# Patient Record
Sex: FEMALE | Race: WHITE | Employment: UNEMPLOYED | URBAN - METROPOLITAN AREA
[De-identification: names, ages, dates, MRNs, and addresses within clinical notes are randomized per-mention and may not be internally consistent; named-entity substitution may affect disease eponyms.]

---

## 2017-03-15 ENCOUNTER — HOSPITAL ENCOUNTER (EMERGENCY)
Age: 57
Discharge: HOME | End: 2017-03-15
Attending: EMERGENCY MEDICINE
Payer: COMMERCIAL

## 2017-03-15 VITALS
WEIGHT: 173.72 LBS | OXYGEN SATURATION: 99 % | BODY MASS INDEX: 24.87 KG/M2 | HEIGHT: 70 IN | HEART RATE: 87 BPM | TEMPERATURE: 97.5 F | DIASTOLIC BLOOD PRESSURE: 105 MMHG | RESPIRATION RATE: 18 BRPM | SYSTOLIC BLOOD PRESSURE: 128 MMHG

## 2017-03-15 DIAGNOSIS — M79.651 PAIN OF RIGHT THIGH: ICD-10-CM
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
## 2017-03-15 DIAGNOSIS — M25.551 PAIN OF RIGHT HIP JOINT: ICD-10-CM

## 2017-03-15 PROCEDURE — 99283 EMERGENCY DEPT VISIT LOW MDM: CPT

## 2017-03-15 PROCEDURE — 74011250637 HC RX REV CODE- 250/637: Performed by: PHYSICIAN ASSISTANT

## 2017-03-15 RX ORDER — OXYCODONE AND ACETAMINOPHEN 5; 325 MG/1; MG/1
1 TABLET ORAL
Status: COMPLETED | OUTPATIENT
Start: 2017-03-15 | End: 2017-03-15

## 2017-03-15 RX ORDER — NAPROXEN 500 MG/1
500 TABLET ORAL 2 TIMES DAILY WITH MEALS
Qty: 20 TAB | Refills: 0 | Status: SHIPPED | OUTPATIENT
Start: 2017-03-15 | End: 2017-03-21

## 2017-03-15 RX ORDER — OXYCODONE AND ACETAMINOPHEN 5; 325 MG/1; MG/1
1 TABLET ORAL
Qty: 10 TAB | Refills: 0 | Status: SHIPPED | OUTPATIENT
Start: 2017-03-15 | End: 2017-03-17

## 2017-03-15 RX ORDER — NAPROXEN SODIUM 220 MG
220 TABLET ORAL
Status: DISCONTINUED | OUTPATIENT
Start: 2017-03-15 | End: 2017-03-15

## 2017-03-15 RX ORDER — NAPROXEN 250 MG/1
500 TABLET ORAL
Status: COMPLETED | OUTPATIENT
Start: 2017-03-15 | End: 2017-03-15

## 2017-03-15 RX ADMIN — NAPROXEN 500 MG: 250 TABLET ORAL at 13:01

## 2017-03-15 RX ADMIN — OXYCODONE HYDROCHLORIDE AND ACETAMINOPHEN 1 TABLET: 5; 325 TABLET ORAL at 13:01

## 2017-03-15 NOTE — DISCHARGE INSTRUCTIONS
Thank you for allowing us to provide you with excellent care today. We hope we addressed all of your concerns and needs. We strive to provide excellent quality care in the Emergency Department. Please rate us as excellent, as anything less than excellent does not meet our expectations. If you feel that you have not received excellent quality care or timely care, please ask to speak to the nurse manager. Please choose us in the future for your continued health care needs. The exam and treatment you received in the Emergency Department were for an urgent problem and are not intended as complete care. It is important that you follow-up with a doctor, nurse practitioner, or physician assistant to:  (1) confirm your diagnosis,  (2) re-evaluation of changes in your illness and treatment, and  (3) for ongoing care. If your symptoms become worse or you do not improve as expected and you are unable to reach your usual health care provider, you should return to the Emergency Department. We are available 24 hours a day. Take this sheet with you when you go to your follow-up visit. If you have any problem arranging the follow-up visit, contact [address] [place] [address] 21 [phone])    Make an appointment with your Primary Care doctor for follow up of this visit. Return to the ER if you are unable to be seen in the time recommended on your discharge instructions. Joint Pain: Care Instructions  Your Care Instructions  Many people have small aches and pains from overuse or injury to muscles and joints. Joint injuries often happen during sports or recreation, work tasks, or projects around the home. An overuse injury can happen when you put too much stress on a joint or when you do an activity that stresses the joint over and over, such as using the computer or rowing a boat. You can take action at home to help your muscles and joints get better.  You should feel better in 1 to 2 weeks, but it can take 3 months or more to heal completely. Follow-up care is a key part of your treatment and safety. Be sure to make and go to all appointments, and call your doctor if you are having problems. It's also a good idea to know your test results and keep a list of the medicines you take. How can you care for yourself at home? · Do not put weight on the injured joint for at least a day or two. · For the first day or two after an injury, do not take hot showers or baths, and do not use hot packs. The heat could make swelling worse. · Put ice or a cold pack on the sore joint for 10 to 20 minutes at a time. Try to do this every 1 to 2 hours for the next 3 days (when you are awake) or until the swelling goes down. Put a thin cloth between the ice and your skin. · Wrap the injury in an elastic bandage. Do not wrap it too tightly because this can cause more swelling. · Prop up the sore joint on a pillow when you ice it or anytime you sit or lie down during the next 3 days. Try to keep it above the level of your heart. This will help reduce swelling. · Take an over-the-counter pain medicine, such as acetaminophen (Tylenol), ibuprofen (Advil, Motrin), or naproxen (Aleve). Read and follow all instructions on the label. · After 1 or 2 days of rest, begin moving the joint gently. While the joint is still healing, you can begin to exercise using activities that do not strain or hurt the painful joint. When should you call for help? Call your doctor now or seek immediate medical care if:  · You have signs of infection, such as:  ¨ Increased pain, swelling, warmth, and redness. ¨ Red streaks leading from the joint. ¨ A fever. Watch closely for changes in your health, and be sure to contact your doctor if:  · Your movement or symptoms are not getting better after 1 to 2 weeks of home treatment. Where can you learn more? Go to http://teodoro-radha.info/.   Enter P205 in the search box to learn more about \"Joint Pain: Care Instructions. \"  Current as of: May 23, 2016  Content Version: 11.1  © 0618-7175 [employer], [facility]. Care instructions adapted under license by [employer] (which disclaims liability or warranty for this information). If you have questions about a medical condition or this instruction, always ask your healthcare professional. [place] [address] any warranty or liability for your use of this information.

## 2017-03-15 NOTE — ED NOTES
Discharge instructions reviewed with pt and copy given by Bashir [facility]; pt discharged with family member ambulatory with steady gait

## 2017-03-15 NOTE — ED NOTES
Pt advised that she should not drive while taking percocet, states \"i'm fine, tim driven on 429ZY of morphine before and i have the naloxone injector\"; pt states \"percocet 5mg isn't going to do anything\"; pt broke up percocet pill before putting in her mouth and states \"it will get in my system faster if i let it dissolve\"

## 2017-03-15 NOTE — ED PROVIDER NOTES
HPI Comments: Brandie Hassan is a 55 y.o. female with a PMH of arthritis and chronic back pain presenting ambulatory to the ED from work C/O fall prior to arrival. Patient states that she was seeing clients when she was walking on a floor that recently had been mopped and she slipped a \"did a split\". She denies LOC, or hitting her head. She states that she was able to get up on her own, but has been having pain in her right side, right hip, and right thigh. She states that she always has pain in her low back and has \"chips in her hips\". She states that she used to see a pain management doctor, but \"the [employer] shut him down in [place]", so she has not been taking any pain medications since then. She states that she has not been seen by any other pain management doctors because \"the [employer] [place] will shut them down as well\". She admits to chronic shortness of breath \"because I smoke\". She states that she has smoked since \"I was 12 and now I'm 56\". Denies taking any OTC analgesics PTA. Patient denies any other symptoms or complaints. PCP: Clyde Hoover MD    There are no other complaints, changes or physical findings at this time. The history is provided by the patient. No  was used. Past Medical History:   Diagnosis Date    Arthritis     Chronic back pain        History reviewed. No pertinent surgical history. Family History:   Problem Relation Age of Onset    Cancer Mother      Breast, bone, & lung Cancer  age 79    Heart Disease Father      Massive Heart attck  age 62       Social History     Social History    Marital status: LEGALLY      Spouse name: N/A    Number of children: N/A    Years of education: N/A     Occupational History    Not on file.      Social History Main Topics    Smoking status: Current Every Day Smoker     Packs/day: 4.00     Years: 41.00    Smokeless tobacco: Not on file    Alcohol use No    Drug use: No    Sexual activity: No Other Topics Concern    Not on file     Social History Narrative         ALLERGIES: Amoxil [amoxicillin] and Tetracycline    Review of Systems   Constitutional: Negative for chills and fever. HENT: Negative for sore throat. Eyes: Negative for pain. Respiratory: Negative for cough and shortness of breath. Cardiovascular: Negative for chest pain. Gastrointestinal: Negative for abdominal pain, diarrhea, nausea and vomiting. Genitourinary: Negative for dysuria and hematuria. Musculoskeletal: Positive for arthralgias (right hip, right thigh), back pain (chronic), myalgias (right thigh) and neck pain (chronic). Negative for gait problem and joint swelling. Skin: Negative for rash. Neurological: Positive for headaches. Negative for dizziness, light-headedness and numbness. Vitals:    03/15/17 1206   BP: (!) 128/105   Pulse: 87   Resp: 18   Temp: 97.5 °F (36.4 °C)   SpO2: 99%   Weight: 78.8 kg (173 lb 11.6 oz)   Height: 5' 10\" (1.778 m)            Physical Exam   Constitutional: She is oriented to person, place, and time. She appears well-developed and well-nourished. No distress. 54 y/o  female, disheveled, smells of stale tobacco   HENT:   Head: Normocephalic and atraumatic. Head is without raccoon's eyes, without Rowe's sign, without abrasion and without contusion. Right Ear: External ear normal.   Left Ear: External ear normal.   Nose: Nose normal.   Eyes: Conjunctivae and EOM are normal.   Neck: Normal range of motion. Neck supple. Cardiovascular: Normal rate, regular rhythm and normal heart sounds. No murmur heard. Pulmonary/Chest: Effort normal and breath sounds normal. She has no wheezes. Abdominal: Soft. Bowel sounds are normal. She exhibits no distension. There is no tenderness. There is no guarding. Musculoskeletal: Normal range of motion. She exhibits no edema or tenderness. Right hip: She exhibits bony tenderness.  She exhibits normal range of motion, normal strength, no swelling and no deformity. Right knee: Normal. She exhibits normal range of motion, no swelling, no effusion and no ecchymosis. Legs:  BACK: Normal spinal curvatures. No step off or deformity. NT to palpation. Negative seated SLR bilaterally. Strength of the LE 5/5 and equal bilaterally. Ambulatory without difficulty. Neurological: She is alert and oriented to person, place, and time. Skin: Skin is warm and dry. No rash noted. She is not diaphoretic. Psychiatric: She has a normal mood and affect. Her behavior is normal. Judgment normal.   Nursing note and vitals reviewed. MDM  Number of Diagnoses or Management Options  Fall, initial encounter:   Pain of right hip joint:   Pain of right thigh:   Diagnosis management comments: DDx: strain, sprain, contusion. Low concern for fracture secondary to FROM, no gross deformity or contusions present. Tenderness to palpation along muscle groups. Patient declined any x-rays at this time, stating \"nothing's broken\". Will provide analgesic relief as well as a small amount of pain medication to go home with. Amount and/or Complexity of Data Reviewed  Review and summarize past medical records: yes    Patient Progress  Patient progress: stable    ED Course       Procedures    Chief Complaint   Patient presents with    Fall     pt reports she slipped and fell on a wet floor PTA and c/o R side pain, R hip pain that radiated to R knee, pt denies hitting her head and denies LOC       12:21 PM  The patients presenting problems have been discussed, and they are in agreement with the care plan formulated and outlined with them. I have encouraged them to ask questions as they arise throughout their visit.     MEDICATIONS GIVEN:  Medications   oxyCODONE-acetaminophen (PERCOCET) 5-325 mg per tablet 1 Tab (1 Tab Oral Given 3/15/17 1301)   naproxen (NAPROSYN) tablet 500 mg (500 mg Oral Given 3/15/17 1301)       LABS REVIEWED:  No results found for this or any previous visit (from the past 24 hour(s)). VITAL SIGNS:  Patient Vitals for the past 12 hrs:   Temp Pulse Resp BP SpO2   03/15/17 1206 97.5 °F (36.4 °C) 87 18 (!) 128/105 99 %       PROGRESS NOTES:  12:22 PM   reviewed. Last narcotic fill on 09/07/2016 Oxycodone 15 mg 56 tablets prescribed. Patient receives monthly fill of Valium 10 mg.    12:36 PM  Patient states that her  will be coming to get her to take her home. Discussed that he will need to be present before any narcotic medication can be given. Patient understanding. Patient declined Flexeril or Robaxin, stating \"that sh** don't work\". 1:10 PM  When giving patient her discharge paperwork, she is seen sucking on the percocet and states \"it helps get to the oxycodone faster\". She is complaining that the Percocet is making her mouth sting. Advised her to the swallow the Percocet rather than suck on the remaining tablet. DIAGNOSIS:    1. Fall, initial encounter    2. Pain of right hip joint    3. Pain of right thigh        PLAN:  Follow-up Information     Follow up With Details Comments Contact Wiliam Mccollum MD In 1 week  [address] [place], [place] [address] [address]  [phone]      [facility] EMERGENCY DEPT  As needed, If symptoms worsen [address] [facility] [address]  [phone]        Discharge Medication List as of 3/15/2017 12:59 PM      START taking these medications    Details   naproxen (NAPROSYN) 500 mg tablet Take 1 Tab by mouth two (2) times daily (with meals) for 10 days. , Print, Disp-20 Tab, R-0      oxyCODONE-acetaminophen (PERCOCET) 5-325 mg per tablet Take 1 Tab by mouth every six (6) hours as needed for Pain for up to 3 days. Max Daily Amount: 4 Tabs., Print, Disp-10 Tab, R-0               ED COURSE: The patients hospital course has been uncomplicated.       DISCHARGE NOTE:  1:15 PM  The care plan has been outline with the patient and/or family, who verbally conveyed understanding and agreement. Available results have been reviewed. Patient and/or family understand the follow up plan as outlined and discharge instructions. Should their condition deterioration at any time after discharge the patient agrees to return, follow up sooner than outlined or seek medical assistance at the closest Emergency Room as soon as possible. Questions have been answered.  Discharge instructions and educational information regarding the patient's diagnosis as well a list of reasons why the patient would want to seek immediate medical attention, should their condition change, were reviewed directly with the patient/family

## 2017-03-15 NOTE — LETTER
[facility] EMERGENCY DEPT 
[address] [id number] [phone] Work/School Note Date: 3/15/2017 To Whom It May concern: Susan Wellington was seen and treated today in the emergency room by the following provider(s): 
Attending Provider: Ceasar Jenkins DO Physician Assistant: Jane Mata PA-C. Susan Wellington may return to work on 18 March 2017. Sincerely, Jane Mata PA-C

## 2017-03-15 NOTE — ED NOTES
Received pt to muniz chair, resting in position of comfort, pt state she \"slipped and fell on an unmarked wet floor at work\", denies LOC or hitting her head, states she landed injuring her back, both arms and legs

## 2017-03-17 ENCOUNTER — APPOINTMENT (OUTPATIENT)
Dept: GENERAL RADIOLOGY | Age: 57
End: 2017-03-17
Attending: EMERGENCY MEDICINE
Payer: COMMERCIAL

## 2017-03-17 ENCOUNTER — HOSPITAL ENCOUNTER (EMERGENCY)
Age: 57
Discharge: HOME | End: 2017-03-17
Attending: EMERGENCY MEDICINE
Payer: COMMERCIAL

## 2017-03-17 VITALS
HEIGHT: 70 IN | WEIGHT: 173.72 LBS | OXYGEN SATURATION: 96 % | SYSTOLIC BLOOD PRESSURE: 141 MMHG | DIASTOLIC BLOOD PRESSURE: 101 MMHG | HEART RATE: 88 BPM | RESPIRATION RATE: 16 BRPM | TEMPERATURE: 97.5 F | BODY MASS INDEX: 24.87 KG/M2

## 2017-03-17 DIAGNOSIS — S16.1XXA CERVICAL STRAIN, INITIAL ENCOUNTER: ICD-10-CM

## 2017-03-17 DIAGNOSIS — S76.011A HIP STRAIN, RIGHT, INITIAL ENCOUNTER: ICD-10-CM

## 2017-03-17 DIAGNOSIS — S50.01XA CONTUSION OF RIGHT ELBOW, INITIAL ENCOUNTER: Primary | ICD-10-CM

## 2017-03-17 PROCEDURE — 73080 X-RAY EXAM OF ELBOW: CPT

## 2017-03-17 PROCEDURE — 99283 EMERGENCY DEPT VISIT LOW MDM: CPT

## 2017-03-17 PROCEDURE — 72050 X-RAY EXAM NECK SPINE 4/5VWS: CPT

## 2017-03-17 PROCEDURE — 74011250637 HC RX REV CODE- 250/637: Performed by: EMERGENCY MEDICINE

## 2017-03-17 PROCEDURE — 73502 X-RAY EXAM HIP UNI 2-3 VIEWS: CPT

## 2017-03-17 RX ORDER — OXYCODONE AND ACETAMINOPHEN 5; 325 MG/1; MG/1
1 TABLET ORAL
Status: COMPLETED | OUTPATIENT
Start: 2017-03-17 | End: 2017-03-17

## 2017-03-17 RX ORDER — OXYCODONE AND ACETAMINOPHEN 5; 325 MG/1; MG/1
1 TABLET ORAL
Qty: 12 TAB | Refills: 0 | Status: SHIPPED | OUTPATIENT
Start: 2017-03-17 | End: 2017-03-21

## 2017-03-17 RX ORDER — DIAZEPAM 5 MG/1
5 TABLET ORAL
Qty: 15 TAB | Refills: 0 | Status: SHIPPED | OUTPATIENT
Start: 2017-03-17 | End: 2017-03-24 | Stop reason: CLARIF

## 2017-03-17 RX ORDER — DIAZEPAM 5 MG/1
5 TABLET ORAL
Status: COMPLETED | OUTPATIENT
Start: 2017-03-17 | End: 2017-03-17

## 2017-03-17 RX ADMIN — OXYCODONE HYDROCHLORIDE AND ACETAMINOPHEN 1 TABLET: 5; 325 TABLET ORAL at 19:29

## 2017-03-17 RX ADMIN — DIAZEPAM 5 MG: 5 TABLET ORAL at 19:29

## 2017-03-17 NOTE — ED PROVIDER NOTES
HPI Comments: Yana Moon is a 55 y.o. Female tobacco user 4ppd who has a h/o arthritis, spondylosis and chronic back pain presents to [facility] ED ambulatory with cc posterior neck , right elbow and right hip pain secondary to a ground level fall x 2 days. The patient states that she was at work, when she slipped on the ground, causing her to do the splits in a scissors like angle to the floor. The patient was evaluated at [facility] the day of her fall, at which time she was prescribed Naprosyn and Percocet, she did not have any imaging studies done during this visit. The patient states that she has been compliant with these medications, however she has only had mild pain relief. The patient does not have an orthopaedic specialist that she regularly sees. She does not endorse any exacerbating or alleviating factors for her recent symptom onset. She denies all other symptoms at this time, including any numbness/tinlging, back pain or incontinence. PCP: Alma Peterson MD    There are no other complaints changes or physical findings at this time  Written by JAKOB Martinez as dictated by Hawa Dawn MD.    The history is provided by the patient. Past Medical History:   Diagnosis Date    Arthritis     Chronic back pain        No past surgical history on file. Family History:   Problem Relation Age of Onset    Cancer Mother      Breast, bone, & lung Cancer  age 79    Heart Disease Father      Massive Heart attck  age 62       Social History     Social History    Marital status: LEGALLY      Spouse name: N/A    Number of children: N/A    Years of education: N/A     Occupational History    Not on file.      Social History Main Topics    Smoking status: Current Every Day Smoker     Packs/day: 4.00     Years: 41.00    Smokeless tobacco: Not on file    Alcohol use No    Drug use: No    Sexual activity: No     Other Topics Concern    Not on file     Social History Narrative         ALLERGIES: Amoxil [amoxicillin] and Tetracycline    Review of Systems   Constitutional: Negative. Negative for chills and fever. HENT: Negative. Negative for congestion and rhinorrhea. Respiratory: Negative. Negative for cough, chest tightness and wheezing. Cardiovascular: Negative. Negative for chest pain and palpitations. Gastrointestinal: Negative. Negative for abdominal pain, constipation, nausea and vomiting. Endocrine: Negative. Genitourinary: Negative. Negative for decreased urine volume, flank pain, hematuria and pelvic pain. Musculoskeletal: Positive for arthralgias and neck pain. Negative for back pain, myalgias and neck stiffness. Skin: Negative. Negative for color change, pallor and rash. Neurological: Negative. Negative for dizziness, seizures, weakness, numbness and headaches. Hematological: Negative. Negative for adenopathy. Psychiatric/Behavioral: Negative. All other systems reviewed and are negative. Vitals:    03/17/17 1727   BP: (!) 141/101   Pulse: 88   Resp: 16   Temp: 97.5 °F (36.4 °C)   SpO2: 96%   Weight: 78.8 kg (173 lb 11.6 oz)   Height: 5' 10\" (1.778 m)            Physical Exam   Constitutional: She is oriented to person, place, and time. She appears well-developed and well-nourished. No distress. HENT:   Head: Normocephalic and atraumatic. Mouth/Throat: No oropharyngeal exudate. Eyes: Pupils are equal, round, and reactive to light. Neck: Neck supple. No JVD present. Muscular tenderness present. No spinous process tenderness present. No rigidity. No tracheal deviation and normal range of motion present. No Brudzinski's sign and no Kernig's sign noted. Cardiovascular: Normal rate, regular rhythm, normal heart sounds and intact distal pulses. Exam reveals no gallop and no friction rub. No murmur heard. Pulmonary/Chest: Effort normal and breath sounds normal. No stridor. No respiratory distress. She has no wheezes.  She has no rales. She exhibits no tenderness. Abdominal: Soft. Bowel sounds are normal. She exhibits no distension and no mass. There is no tenderness. There is no rebound and no guarding. Musculoskeletal: Normal range of motion. She exhibits tenderness. Right elbow: She exhibits normal range of motion, no swelling, no effusion and no deformity. Tenderness found. Right hip: She exhibits tenderness. She exhibits normal range of motion, no swelling, no crepitus and no deformity. Left hip: Normal.        Lumbar back: Normal.   Lymphadenopathy:     She has no cervical adenopathy. Neurological: She is alert and oriented to person, place, and time. Skin: Skin is warm. She is not diaphoretic. Nursing note and vitals reviewed. MDM  Number of Diagnoses or Management Options  Cervical strain, initial encounter:   Contusion of right elbow, initial encounter:   Hip strain, right, initial encounter:   Diagnosis management comments: Ddx: Cervical sprain, cervical fx, elbow fx, hip fx, sprain, strain, drug seeking behavior    Impression/Plan: presents after fall while at work last week and in fact was seen here without x-rays performed. X-rays unremarkable today, will treat supportively and recommend f/u with orth as needed. Amount and/or Complexity of Data Reviewed  Tests in the radiology section of CPT®: ordered and reviewed  Obtain history from someone other than the patient: yes  Review and summarize past medical records: yes  Independent visualization of images, tracings, or specimens: yes    Risk of Complications, Morbidity, and/or Mortality  Presenting problems: moderate  Diagnostic procedures: low  Management options: low    Patient Progress  Patient progress: stable    ED Course       Procedures    LABORATORY TESTS:  No results found for this or any previous visit (from the past 12 hour(s)).     IMAGING RESULTS:  EXAM: XR HIP RT W OR WO PELV 2-3 VWS     INDICATION: fall.     COMPARISON: None.     FINDINGS: An AP view of the pelvis and a frogleg lateral view of the right hip  demonstrate no fracture, dislocation or other acute abnormality. There are  slight degenerative changes     IMPRESSION  IMPRESSION: No acute fracture identified. .     INDICATION: fall.     EXAM: CERVICAL SPINE RADIOGRAPHS, MINIMUM 4 VIEWS.     COMPARISON: 1/21/2015     FINDINGS: A five-view examination of the cervical spine reveals normal bony  alignment and bone mineral content for age. There is no obvious acute fracture  or dislocation . Vertebral body heights are preserved. Disc space heights are  diminished at C5-6 and C6-7 with endplate sclerosis and spondylosis. . The  prevertebral soft tissue space is normal, as is the predental space. Odontoid  view shows normal alignment. . There is mild diffuse degenerative change.     IMPRESSION  IMPRESSION: No acute bony abnormality of the cervical spine . Multilevel  degenerative disc disease. Pranav Tuckahoe EXAM: XR ELBOW RT MIN 3 V     INDICATION: fall.     COMPARISON: None.     FINDINGS: Three views of the right elbow demonstrate no fracture, dislocation,  effusion or other acute abnormality.     IMPRESSION  IMPRESSION: No acute abnormality.     MEDICATIONS GIVEN:  Medications   oxyCODONE-acetaminophen (PERCOCET) 5-325 mg per tablet 1 Tab (1 Tab Oral Given 3/17/17 1929)   diazePAM (VALIUM) tablet 5 mg (5 mg Oral Given 3/17/17 1929)       IMPRESSION:  1. Contusion of right elbow, initial encounter    2. Cervical strain, initial encounter    3. Hip strain, right, initial encounter        PLAN:  1. Current Discharge Medication List      START taking these medications    Details   diazePAM (VALIUM) 5 mg tablet Take 1 Tab by mouth every eight (8) hours as needed for Anxiety.  Max Daily Amount: 15 mg.  Qty: 15 Tab, Refills: 0         CONTINUE these medications which have CHANGED    Details   oxyCODONE-acetaminophen (PERCOCET) 5-325 mg per tablet Take 1 Tab by mouth every four (4) hours as needed for Pain. Max Daily Amount: 6 Tabs. Qty: 12 Tab, Refills: 0           2. Follow-up Information     Follow up With Details Comments Contact Tommie Rossi MD Call in 2 days  [address]  [phone]      [facility] EMERGENCY DEPT  If symptoms worsen [address]  [address] [facility]  [phone]        Return to ED if worse   Discharge Note:  8:58 PM  The patient is ready for discharge. The patient's signs, symptoms, diagnosis, and discharge instructions have been discussed and the patient has conveyed their understanding. The patient is to follow up as recommended or return to the ER should their symptoms worsen. Plan has been discussed and the patient is in agreement. This note is prepared by Derwin Schwab acting as Scribe for Yonug Samano MD.    Young Samano MD: The Scribe's documentation has been prepared under my direction and personally reviewed by me in its entirety. I confirm that the note above accurately reflects all work, treatment, procedures, and medical decision making performed by me.

## 2017-03-17 NOTE — ED NOTES
Patient reports \"falling last Wednesday and refused xray at this time but feels that it is necessary today because her pain is not getting any better and she also ran out of her Percocet that was prescribed to her. \" Patient reports right sided upper neck pain that radiates down right side of body all the way down right leg to right ankle.

## 2017-03-17 NOTE — ED NOTES
Pt didn't want to sit on the stretcher, taken to muniz bed via wheelchair-- transferred to chair without difficulty

## 2017-03-18 NOTE — DISCHARGE INSTRUCTIONS
Neck Strain: Care Instructions  Your Care Instructions  You have strained the muscles and ligaments in your neck. A sudden, awkward movement can strain the neck. This often occurs with falls or car accidents or during certain sports. Everyday activities like working on a computer or sleeping can also cause neck strain if they force you to hold your neck in an awkward position for a long time. It is common for neck pain to get worse for a day or two after an injury, but it should start to feel better after that. You may have more pain and stiffness for several days before it gets better. This is expected. It may take a few weeks or longer for it to heal completely. Good home treatment can help you get better faster and avoid future neck problems. Follow-up care is a key part of your treatment and safety. Be sure to make and go to all appointments, and call your doctor if you are having problems. It's also a good idea to know your test results and keep a list of the medicines you take. How can you care for yourself at home? · If you were given a neck brace (cervical collar) to limit neck motion, wear it as instructed for as many days as your doctor tells you to. Do not wear it longer than you were told to. Wearing a brace for too long can make neck stiffness worse and weaken the neck muscles. · You can try using heat or ice to see if it helps. ¨ Try using a heating pad on a low or medium setting for 15 to 20 minutes every 2 to 3 hours. Try a warm shower in place of one session with the heating pad. You can also buy single-use heat wraps that last up to 8 hours. ¨ You can also try an ice pack for 10 to 15 minutes every 2 to 3 hours. · Take pain medicines exactly as directed. ¨ If the doctor gave you a prescription medicine for pain, take it as prescribed. ¨ If you are not taking a prescription pain medicine, ask your doctor if you can take an over-the-counter medicine.   · Gently rub the area to relieve pain and help with blood flow. Do not massage the area if it hurts to do so. · Do not do anything that makes the pain worse. Take it easy for a couple of days. You can do your usual activities if they do not hurt your neck or put it at risk for more stress or injury. · Try sleeping on a special neck pillow. Place it under your neck, not under your head. Placing a tightly rolled-up towel under your neck while you sleep will also work. If you use a neck pillow or rolled towel, do not use your regular pillow at the same time. · To prevent future neck pain, do exercises to stretch and strengthen your neck and back. Learn how to use good posture, safe lifting techniques, and proper body mechanics. When should you call for help? Call 911 anytime you think you may need emergency care. For example, call if:  · You are unable to move an arm or a leg at all. Call your doctor now or seek immediate medical care if:  · You have new or worse symptoms in your arms, legs, chest, belly, or buttocks. Symptoms may include:  ¨ Numbness or tingling. ¨ Weakness. ¨ Pain. · You lose bladder or bowel control. Watch closely for changes in your health, and be sure to contact your doctor if:  · You are not getting better as expected. Where can you learn more? Go to http://teodoro-radha.info/. Enter M253 in the search box to learn more about \"Neck Strain: Care Instructions. \"  Current as of: May 23, 2016  Content Version: 11.1  © 2659-9726 [employer]. Care instructions adapted under license by [employer] (which disclaims liability or warranty for this information). If you have questions about a medical condition or this instruction, always ask your healthcare professional. [address] any warranty or liability for your use of this information. [facility] Activation    Thank you for requesting access to [facility].  Please follow the instructions below to securely access and download your online medical record. [facility] allows you to send messages to your doctor, view your test results, renew your prescriptions, schedule appointments, and more. How Do I Sign Up? 1. In your internet browser, go to [email or web address]  2. Click on the First Time User? Click Here link in the Sign In box. You will be redirect to the New Member Sign Up page. 3. Enter your [facility] Access Code exactly as it appears below. You will not need to use this code after youve completed the sign-up process. If you do not sign up before the expiration date, you must request a new code. [facility] Access Code: Activation code not generated  Current [facility] Status: Active (This is the date your [facility] access code will )    4. Enter the last four digits of your Social Security Number (xxxx) and Date of Birth (mm/dd/yyyy) as indicated and click Submit. You will be taken to the next sign-up page. 5. Create a [facility] ID. This will be your [facility] login ID and cannot be changed, so think of one that is secure and easy to remember. 6. Create a [facility] password. You can change your password at any time. 7. Enter your Password Reset Question and Answer. This can be used at a later time if you forget your password. 8. Enter your e-mail address. You will receive e-mail notification when new information is available in [address] 9. Click Sign Up. You can now view and download portions of your medical record. 10. Click the Download Summary menu link to download a portable copy of your medical information. Additional Information    If you have questions, please visit the Frequently Asked Questions section of the [facility]ius website at [email or web address] Remember, [facility] is NOT to be used for urgent needs. For medical emergencies, dial 911.

## 2017-03-21 ENCOUNTER — HOSPITAL ENCOUNTER (EMERGENCY)
Age: 57
Discharge: HOME | End: 2017-03-21
Attending: EMERGENCY MEDICINE
Payer: COMMERCIAL

## 2017-03-21 VITALS
WEIGHT: 172.18 LBS | BODY MASS INDEX: 25.5 KG/M2 | OXYGEN SATURATION: 99 % | TEMPERATURE: 97.5 F | HEIGHT: 69 IN | HEART RATE: 82 BPM | DIASTOLIC BLOOD PRESSURE: 105 MMHG | SYSTOLIC BLOOD PRESSURE: 157 MMHG | RESPIRATION RATE: 16 BRPM

## 2017-03-21 DIAGNOSIS — Z72.0 TOBACCO ABUSE: ICD-10-CM

## 2017-03-21 DIAGNOSIS — M25.551 RIGHT HIP PAIN: Primary | ICD-10-CM

## 2017-03-21 DIAGNOSIS — F41.9 ANXIETY: ICD-10-CM

## 2017-03-21 PROCEDURE — 74011250637 HC RX REV CODE- 250/637: Performed by: PHYSICIAN ASSISTANT

## 2017-03-21 PROCEDURE — 99283 EMERGENCY DEPT VISIT LOW MDM: CPT

## 2017-03-21 RX ORDER — OXYCODONE AND ACETAMINOPHEN 5; 325 MG/1; MG/1
1 TABLET ORAL
Qty: 10 TAB | Refills: 0 | Status: SHIPPED | OUTPATIENT
Start: 2017-03-21 | End: 2017-03-24 | Stop reason: CLARIF

## 2017-03-21 RX ORDER — OXYCODONE AND ACETAMINOPHEN 5; 325 MG/1; MG/1
1 TABLET ORAL
Status: COMPLETED | OUTPATIENT
Start: 2017-03-21 | End: 2017-03-21

## 2017-03-21 RX ORDER — DIAZEPAM 5 MG/1
5 TABLET ORAL
Qty: 10 TAB | Refills: 0 | Status: SHIPPED | OUTPATIENT
Start: 2017-03-21 | End: 2017-03-24 | Stop reason: CLARIF

## 2017-03-21 RX ADMIN — OXYCODONE HYDROCHLORIDE AND ACETAMINOPHEN 1 TABLET: 5; 325 TABLET ORAL at 12:13

## 2017-03-21 NOTE — DISCHARGE INSTRUCTIONS
Hip Pain: Care Instructions  Your Care Instructions  Hip pain may be caused by many things, including overuse, a fall, or a twisting movement. Another cause of hip pain is arthritis. Your pain may increase when you stand up, walk, or squat. The pain may come and go or may be constant. Home treatment can help relieve hip pain, swelling, and stiffness. If your pain is ongoing, you may need more tests and treatment. Follow-up care is a key part of your treatment and safety. Be sure to make and go to all appointments, and call your doctor if you are having problems. Its also a good idea to know your test results and keep a list of the medicines you take. How can you care for yourself at home? · Take pain medicines exactly as directed. ¨ If the doctor gave you a prescription medicine for pain, take it as prescribed. ¨ If you are not taking a prescription pain medicine, ask your doctor if you can take an over-the-counter medicine. · Rest and protect your hip. Take a break from any activity, including standing or walking, that may cause pain. · Put ice or a cold pack against your hip for 10 to 20 minutes at a time. Try to do this every 1 to 2 hours for the next 3 days (when you are awake) or until the swelling goes down. Put a thin cloth between the ice and your skin. · Sleep on your healthy side with a pillow between your knees, or sleep on your back with pillows under your knees. · If there is no swelling, you can put moist heat, a heating pad, or a warm cloth on your hip. Do gentle stretching exercises to help keep your hip flexible. · Learn how to prevent falls. Have your vision and hearing checked regularly. Wear slippers or shoes with a nonskid sole. · Stay at a healthy weight. · Wear comfortable shoes. When should you call for help? Call 911 anytime you think you may need emergency care. For example, call if:  · You have sudden chest pain and shortness of breath, or you cough up blood.   · You are not able to stand or walk or bear weight. · Your buttocks, legs, or feet feel numb or tingly. · Your leg or foot is cool or pale or changes color. · You have severe pain. Call your doctor now or seek immediate medical care if:  · You have signs of infection, such as:  ¨ Increased pain, swelling, warmth, or redness in the hip area. ¨ Red streaks leading from the hip area. ¨ Pus draining from the hip area. ¨ A fever. · You have signs of a blood clot, such as:  ¨ Pain in your calf, back of the knee, thigh, or groin. ¨ Redness and swelling in your leg or groin. · You are not able to bend, straighten, or move your leg normally. · You have trouble urinating or having bowel movements. Watch closely for changes in your health, and be sure to contact your doctor if:  · You do not get better as expected. Where can you learn more? Go to http://teodoro-radha.info/. Enter K480 in the search box to learn more about \"Hip Pain: Care Instructions. \"  Current as of: May 27, 2016  Content Version: 11.1  © 0813-2800 [employer]. Care instructions adapted under license by [employer] (which disclaims liability or warranty for this information). If you have questions about a medical condition or this instruction, always ask your healthcare professional. [place] [address] any warranty or liability for your use of this information. Anxiety Disorder: Care Instructions  Your Care Instructions  Anxiety is a normal reaction to stress. Difficult situations can cause you to have symptoms such as sweaty palms and a nervous feeling. In an anxiety disorder, the symptoms are far more severe. Constant worry, muscle tension, trouble sleeping, nausea and diarrhea, and other symptoms can make normal daily activities difficult or impossible. These symptoms may occur for no reason, and they can affect your work, school, or social life.  Medicines, counseling, and self-care can all help. Follow-up care is a key part of your treatment and safety. Be sure to make and go to all appointments, and call your doctor if you are having problems. It's also a good idea to know your test results and keep a list of the medicines you take. How can you care for yourself at home? · Take medicines exactly as directed. Call your doctor if you think you are having a problem with your medicine. · Go to your counseling sessions and follow-up appointments. · Recognize and accept your anxiety. Then, when you are in a situation that makes you anxious, say to yourself, \"This is not an emergency. I feel uncomfortable, but I am not in danger. I can keep going even if I feel anxious. \"  · Be kind to your body:  ¨ Relieve tension with exercise or a massage. ¨ Get enough rest.  ¨ Avoid alcohol, caffeine, nicotine, and illegal drugs. They can increase your anxiety level and cause sleep problems. ¨ Learn and do relaxation techniques. See below for more about these techniques. · Engage your mind. Get out and do something you enjoy. Go to a [employer] movie, or take a walk or hike. Plan your day. Having too much or too little to do can make you anxious. · Keep a record of your symptoms. Discuss your fears with a good friend or family member, or join a support group for people with similar problems. Talking to others sometimes relieves stress. · Get involved in social groups, or volunteer to help others. Being alone sometimes makes things seem worse than they are. · Get at least 30 minutes of exercise on most days of the week to relieve stress. Walking is a good choice. You also may want to do other activities, such as running, swimming, cycling, or playing tennis or team sports. Relaxation techniques  Do relaxation exercises 10 to 20 minutes a day. You can play soothing, relaxing music while you do them, if you wish. · Tell others in your house that you are going to do your relaxation exercises.  Ask them not to disturb you. · Find a comfortable place, away from all distractions and noise. · Lie down on your back, or sit with your back straight. · Focus on your breathing. Make it slow and steady. · Breathe in through your nose. Breathe out through either your nose or mouth. · Breathe deeply, filling up the area between your navel and your rib cage. Breathe so that your belly goes up and down. · Do not hold your breath. · Breathe like this for 5 to 10 minutes. Notice the feeling of calmness throughout your whole body. As you continue to breathe slowly and deeply, relax by doing the following for another 5 to 10 minutes:  · Tighten and relax each muscle group in your body. You can begin at your toes and work your way up to your head. · Imagine your muscle groups relaxing and becoming heavy. · Empty your mind of all thoughts. · Let yourself relax more and more deeply. · Become aware of the state of calmness that surrounds you. · When your relaxation time is over, you can bring yourself back to alertness by moving your fingers and toes and then your hands and feet and then stretching and moving your entire body. Sometimes people fall asleep during relaxation, but they usually wake up shortly afterward. · Always give yourself time to return to full alertness before you drive a car or do anything that might cause an accident if you are not fully alert. Never play a relaxation tape while you drive a car. When should you call for help? Call 911 anytime you think you may need emergency care. For example, call if:  · You feel you cannot stop from hurting yourself or someone else. Keep the numbers for these national suicide hotlines: 3-284-514-TALK (4-872.300.9330) and 3-016-YOKYLJW (3-273.564.9635). If you or someone you know talks about suicide or feeling hopeless, get help right away.   Watch closely for changes in your health, and be sure to contact your doctor if:  · You have anxiety or fear that affects your life. · You have symptoms of anxiety that are new or different from those you had before. Where can you learn more? Go to http://teodoro-radha.info/. Enter P754 in the search box to learn more about \"Anxiety Disorder: Care Instructions. \"  Current as of: July 26, 2016  Content Version: 11.1  © 7483-7265 [employer]. Care instructions adapted under license by [employer] (which disclaims liability or warranty for this information). If you have questions about a medical condition or this instruction, always ask your healthcare professional. [place] [address] any warranty or liability for your use of this information. Learning About Benefits From Quitting Smoking  How does quitting smoking make you healthier? If you're thinking about quitting smoking, you may have a few reasons to be smoke-free. Your health may be one of them. · When you quit smoking, you lower your risks for cancer, lung disease, heart attack, stroke, blood vessel disease, and blindness from macular degeneration. · When you're smoke-free, you get sick less often, and you heal faster. You are less likely to get colds, flu, bronchitis, and pneumonia. · As a nonsmoker, you may find that your mood is better and you are less stressed. When and how will you feel healthier? Quitting has real health benefits that start from day 1 of being smoke-free. And the longer you stay smoke-free, the healthier you get and the better you feel. The first hours  · After just 20 minutes, your blood pressure and heart rate go down. That means there's less stress on your heart and blood vessels. · Within 12 hours, the level of carbon monoxide in your blood drops back to normal. That makes room for more oxygen. With more oxygen in your body, you may notice that you have more energy than when you smoked. After 2 weeks  · Your lungs start to work better.   · Your risk of heart attack starts to drop. After 1 month  · When your lungs are clear, you cough less and breathe deeper, so it's easier to be active. · Your sense of taste and smell return. That means you can enjoy food more than you have since you started smoking. Over the years  · After 1 year, your risk of heart disease is half what it would be if you kept smoking. · After 5 years, your risk of stroke starts to shrink. Within a few years after that, it's about the same as if you'd never smoked. · After 10 years, your risk of dying from lung cancer is cut by about half. And your risk for many other types of cancer is lower too. How would quitting help others in your life? When you quit smoking, you improve the health of everyone who now breathes in your smoke. · Their heart, lung, and cancer risks drop, much like yours. · They are sick less. For babies and small children, living smoke-free means they're less likely to have ear infections, pneumonia, and bronchitis. · If you're a woman who is or will be pregnant someday, quitting smoking means a healthier . · Children who are close to you are less likely to become adult smokers. Where can you learn more? Go to http://teodoro-radha.info/. Enter [id number] [id number] [id number] in the search box to learn more about \"Learning About Benefits From Quitting Smoking. \"  Current as of: May 26, 2016  Content Version: 11.1  © 5250-3872 [employer]. Care instructions adapted under license by [employer] (which disclaims liability or warranty for this information). If you have questions about a medical condition or this instruction, always ask your healthcare professional. [address] any warranty or liability for your use of this information.

## 2017-03-21 NOTE — ED PROVIDER NOTES
HPI Comments: Lia Escobar is a 55 y.o. female who presents  to the ED c/o persistent right hip pain after a slip and fall on 03/15/2016. She was evaluated here on that day and then again 2 days later here. Plain films from that visit were negative for acute process. She tells me her pain persists. She has attended Pain Management in the past for her chronic pain issues however her new Pain Management appointment is not until mid April. Pt states she did stumble this morning and hit her head prior to going to work. While at work she states the pain in her hip become worse and she came to the ED. PCP: Claudette Bugler, MD    There are no other complaints, changes, or physical findings at this time. Patient is a 55 y.o. female presenting with medication refill and fall. Medication Refill   Pertinent negatives include no chest pain, no abdominal pain, no headaches and no shortness of breath. Fall   Pertinent negatives include no fever, no numbness, no abdominal pain, no nausea, no vomiting and no headaches. Past Medical History:   Diagnosis Date    Arthritis     Chronic back pain        History reviewed. No pertinent surgical history. Family History:   Problem Relation Age of Onset    Cancer Mother      Breast, bone, & lung Cancer  age 79    Heart Disease Father      Massive Heart attck  age 62       Social History     Social History    Marital status: LEGALLY      Spouse name: N/A    Number of children: N/A    Years of education: N/A     Occupational History    Not on file.      Social History Main Topics    Smoking status: Current Every Day Smoker     Packs/day: 4.00     Years: 41.00    Smokeless tobacco: Not on file    Alcohol use No    Drug use: No    Sexual activity: No     Other Topics Concern    Not on file     Social History Narrative         ALLERGIES: Amoxil [amoxicillin] and Tetracycline    Review of Systems   Constitutional: Negative for fatigue and fever.   HENT: Negative for ear pain and sore throat. Eyes: Negative for pain, redness and visual disturbance. Respiratory: Negative for cough and shortness of breath. Cardiovascular: Negative for chest pain and palpitations. Gastrointestinal: Negative for abdominal pain, nausea and vomiting. Genitourinary: Negative for dysuria, frequency and urgency. Musculoskeletal: Negative for back pain, gait problem, neck pain and neck stiffness. Right hip pain   Skin: Negative for rash and wound. Neurological: Negative for dizziness, weakness, light-headedness, numbness and headaches. Vitals:    03/21/17 1136   BP: (!) 157/105   Pulse: 82   Resp: 16   Temp: 97.5 °F (36.4 °C)   SpO2: 99%   Weight: 78.1 kg (172 lb 2.9 oz)   Height: 5' 9\" (1.753 m)            Physical Exam   Constitutional: She is oriented to person, place, and time. She appears well-developed and well-nourished. Non-toxic appearance. No distress. HENT:   Head: Normocephalic and atraumatic. Right Ear: External ear normal.   Left Ear: External ear normal.   Nose: Nose normal.   Mouth/Throat: Uvula is midline. No trismus in the jaw. Eyes: Conjunctivae and EOM are normal. Pupils are equal, round, and reactive to light. No scleral icterus. Neck: Normal range of motion and full passive range of motion without pain. Cardiovascular: Normal rate and regular rhythm. Pulmonary/Chest: Effort normal. No accessory muscle usage. No tachypnea. No respiratory distress. She has no decreased breath sounds. She has no wheezes. Abdominal: Soft. There is no tenderness. Musculoskeletal: Normal range of motion. Right hip: She exhibits tenderness. She exhibits normal range of motion and normal strength. Legs:  RIGHT HIP:   Good symmetry  Ambulates with no appreciable limp  Lateral tenderness   Neurological: She is alert and oriented to person, place, and time. She is not disoriented. No cranial nerve deficit.  GCS eye subscore is 4. GCS verbal subscore is 5. GCS motor subscore is 6. Skin: Skin is intact. No rash noted. Psychiatric: She has a normal mood and affect. Her speech is normal.   Nursing note and vitals reviewed. [facility]  ED Course       Procedures     TOBACCO COUNSELING:  Upon evaluation, pt expressed that they are a current tobacco user. Pt has been counseled on the dangers of smoking and was encouraged to quit as soon as possible in order to decrease further risks to their health. Pt has conveyed their understanding of the risks involved should they continue to use tobacco products. HYPERTENSION COUNSELING:  Patient denies chest pain, headache, shortness of breath. Patient is made aware of their elevated blood pressure and is instructed to follow up this week with their Primary Care for a recheck. Patient is counseled regarding consequences of chronic, uncontrolled hypertension including kidney disease, heart disease, stroke or even death. Patient states their understanding. LABORATORY TESTS:  No results found for this or any previous visit (from the past 12 hour(s)). IMAGING RESULTS:  No orders to display       MEDICATIONS GIVEN:  Medications   oxyCODONE-acetaminophen (PERCOCET) 5-325 mg per tablet 1 Tab (1 Tab Oral Given 3/21/17 1213)       IMPRESSION:  1. Right hip pain    2. Anxiety    3. Tobacco abuse        PLAN:  1. Current Discharge Medication List      START taking these medications    Details   oxyCODONE-acetaminophen (PERCOCET) 5-325 mg per tablet Take 1 Tab by mouth every four (4) hours as needed for Pain. Max Daily Amount: 6 Tabs. Qty: 10 Tab, Refills: 0      !! diazePAM (VALIUM) 5 mg tablet Take 1 Tab by mouth three (3) times daily as needed for Anxiety (spasm). Max Daily Amount: 15 mg.  Qty: 10 Tab, Refills: 0       !! - Potential duplicate medications found. Please discuss with provider.       CONTINUE these medications which have NOT CHANGED    Details   !! diazePAM (VALIUM) 5 mg tablet Take 1 Tab by mouth every eight (8) hours as needed for Anxiety. Max Daily Amount: 15 mg.  Qty: 15 Tab, Refills: 0       !! - Potential duplicate medications found. Please discuss with provider.         2.   Follow-up Information     Follow up With Details Comments Contact Obey Allen MD Schedule an appointment as soon as possible for a visit As needed [facility] [address]      June Webber MD Schedule an appointment as soon as possible for a visit ORTHO: as needed if symptoms persist [address]  [address] [facility] [address]  [phone]       Agustín Brown MD Schedule an appointment as soon as possible for a visit ORTHO: as needed [address]   [address]  [facility] [place] [address]  [phone]          Return to ED if worse

## 2017-03-21 NOTE — LETTER
[address] 
[facility] EMERGENCY DEPT 
[address] [place] [address] [address] [id number] [phone] Work/School Note Date: 3/21/2017 To Whom It May concern: Meir Kwong was seen and treated today in the emergency room by the following provider(s): 
Attending Provider: Tennille Jhaveri MD 
Physician Assistant: HENRY Reddy. Meir Kwong may return to work on [id number]. Sincerely, HENRY Reddy

## 2017-03-24 ENCOUNTER — HOSPITAL ENCOUNTER (EMERGENCY)
Age: 57
Discharge: HOME | End: 2017-03-24
Attending: EMERGENCY MEDICINE
Payer: COMMERCIAL

## 2017-03-24 VITALS
TEMPERATURE: 97.9 F | HEART RATE: 94 BPM | RESPIRATION RATE: 14 BRPM | HEIGHT: 70 IN | OXYGEN SATURATION: 98 % | DIASTOLIC BLOOD PRESSURE: 93 MMHG | WEIGHT: 170.64 LBS | BODY MASS INDEX: 24.43 KG/M2 | SYSTOLIC BLOOD PRESSURE: 131 MMHG

## 2017-03-24 DIAGNOSIS — M19.90 ARTHRITIS: ICD-10-CM

## 2017-03-24 DIAGNOSIS — F17.210 CIGARETTE NICOTINE DEPENDENCE WITHOUT COMPLICATION: ICD-10-CM

## 2017-03-24 DIAGNOSIS — M25.552 ACUTE PAIN OF LEFT HIP: Primary | ICD-10-CM

## 2017-03-24 PROCEDURE — 74011250637 HC RX REV CODE- 250/637: Performed by: PHYSICIAN ASSISTANT

## 2017-03-24 PROCEDURE — 99283 EMERGENCY DEPT VISIT LOW MDM: CPT

## 2017-03-24 RX ORDER — OXYCODONE AND ACETAMINOPHEN 5; 325 MG/1; MG/1
1 TABLET ORAL
Status: COMPLETED | OUTPATIENT
Start: 2017-03-24 | End: 2017-03-24

## 2017-03-24 RX ORDER — NAPROXEN 500 MG/1
TABLET ORAL
COMMUNITY
Start: 2017-03-15 | End: 2017-03-24

## 2017-03-24 RX ORDER — OXYCODONE AND ACETAMINOPHEN 5; 325 MG/1; MG/1
.5-1 TABLET ORAL
Qty: 20 TAB | Refills: 0 | Status: SHIPPED | OUTPATIENT
Start: 2017-03-24 | End: 2017-04-20 | Stop reason: ALTCHOICE

## 2017-03-24 RX ORDER — OXYCODONE AND ACETAMINOPHEN 5; 325 MG/1; MG/1
TABLET ORAL
Refills: 0 | COMMUNITY
Start: 2017-03-21 | End: 2017-03-24

## 2017-03-24 RX ORDER — DIAZEPAM 5 MG/1
TABLET ORAL
Refills: 0 | COMMUNITY
Start: 2017-03-21 | End: 2017-03-24

## 2017-03-24 RX ORDER — NAPROXEN 250 MG/1
500 TABLET ORAL
Status: COMPLETED | OUTPATIENT
Start: 2017-03-24 | End: 2017-03-24

## 2017-03-24 RX ORDER — PIROXICAM 20 MG/1
20 CAPSULE ORAL DAILY
Qty: 30 CAP | Refills: 0 | Status: SHIPPED | OUTPATIENT
Start: 2017-03-24 | End: 2017-04-20 | Stop reason: ALTCHOICE

## 2017-03-24 RX ADMIN — NAPROXEN 500 MG: 250 TABLET ORAL at 18:53

## 2017-03-24 RX ADMIN — OXYCODONE HYDROCHLORIDE AND ACETAMINOPHEN 1 TABLET: 5; 325 TABLET ORAL at 18:53

## 2017-03-24 NOTE — DISCHARGE INSTRUCTIONS
Learning About Benefits From Quitting Smoking  How does quitting smoking make you healthier? If you're thinking about quitting smoking, you may have a few reasons to be smoke-free. Your health may be one of them. · When you quit smoking, you lower your risks for cancer, lung disease, heart attack, stroke, blood vessel disease, and blindness from macular degeneration. · When you're smoke-free, you get sick less often, and you heal faster. You are less likely to get colds, flu, bronchitis, and pneumonia. · As a nonsmoker, you may find that your mood is better and you are less stressed. When and how will you feel healthier? Quitting has real health benefits that start from day 1 of being smoke-free. And the longer you stay smoke-free, the healthier you get and the better you feel. The first hours  · After just 20 minutes, your blood pressure and heart rate go down. That means there's less stress on your heart and blood vessels. · Within 12 hours, the level of carbon monoxide in your blood drops back to normal. That makes room for more oxygen. With more oxygen in your body, you may notice that you have more energy than when you smoked. After 2 weeks  · Your lungs start to work better. · Your risk of heart attack starts to drop. After 1 month  · When your lungs are clear, you cough less and breathe deeper, so it's easier to be active. · Your sense of taste and smell return. That means you can enjoy food more than you have since you started smoking. Over the years  · After 1 year, your risk of heart disease is half what it would be if you kept smoking. · After 5 years, your risk of stroke starts to shrink. Within a few years after that, it's about the same as if you'd never smoked. · After 10 years, your risk of dying from lung cancer is cut by about half. And your risk for many other types of cancer is lower too. How would quitting help others in your life?   When you quit smoking, you improve the health of everyone who now breathes in your smoke. · Their heart, lung, and cancer risks drop, much like yours. · They are sick less. For babies and small children, living smoke-free means they're less likely to have ear infections, pneumonia, and bronchitis. · If you're a woman who is or will be pregnant someday, quitting smoking means a healthier . · Children who are close to you are less likely to become adult smokers. Where can you learn more? Go to http://teodoro-radha.info/. Enter 052 [id number] 72 11 in the search box to learn more about \"Learning About Benefits From Quitting Smoking. \"  Current as of: May 26, 2016  Content Version: 11.1  © 8479-4450 [employer]. Care instructions adapted under license by [employer] (which disclaims liability or warranty for this information). If you have questions about a medical condition or this instruction, always ask your healthcare professional. [place] [address] any warranty or liability for your use of this information. Arthritis: Care Instructions  Your Care Instructions  Arthritis, also called osteoarthritis, is a breakdown of the cartilage that cushions your joints. When the cartilage wears down, your bones rub against each other. This causes pain and stiffness. Many people have some arthritis as they age. Arthritis most often affects the joints of the spine, hands, hips, knees, or feet. You can take simple measures to protect your joints, ease your pain, and help you stay active. Follow-up care is a key part of your treatment and safety. Be sure to make and go to all appointments, and call your doctor if you are having problems. It's also a good idea to know your test results and keep a list of the medicines you take. How can you care for yourself at home? · Stay at a healthy weight. Being overweight puts extra strain on your joints.   · Talk to your doctor or physical therapist about exercises that will help ease joint pain. ¨ Stretch. You may enjoy gentle forms of yoga to help keep your joints and muscles flexible. ¨ Walk instead of jog. Other types of exercise that are less stressful on the joints include riding a bicycle, swimming, or water exercise. ¨ Lift weights. Strong muscles help reduce stress on your joints. Stronger thigh muscles, for example, take some of the stress off of the knees and hips. Learn the right way to lift weights so you do not make joint pain worse. · Take your medicines exactly as prescribed. Call your doctor if you think you are having a problem with your medicine. · Take pain medicines exactly as directed. ¨ If the doctor gave you a prescription medicine for pain, take it as prescribed. ¨ If you are not taking a prescription pain medicine, ask your doctor if you can take an over-the-counter medicine. · Use a cane, crutch, walker, or another device if you need help to get around. These can help rest your joints. You also can use other things to make life easier, such as a higher toilet seat and padded handles on kitchen utensils. · Do not sit in low chairs, which can make it hard to get up. · Put heat or cold on your sore joints as needed. Use whichever helps you most. You also can take turns with hot and cold packs. ¨ Apply heat 2 or 3 times a day for 20 to 30 minutes--using a heating pad, hot shower, or hot pack--to relieve pain and stiffness. ¨ Put ice or a cold pack on your sore joint for 10 to 20 minutes at a time. Put a thin cloth between the ice and your skin. When should you call for help? Call your doctor now or seek immediate medical care if:  · You have sudden swelling, warmth, or pain in any joint. · You have joint pain and a fever or rash. · You have such bad pain that you cannot use a joint.   Watch closely for changes in your health, and be sure to contact your doctor if:  · You have mild joint symptoms that continue even with more than 6 weeks of care at home. · You have stomach pain or other problems with your medicine. Where can you learn more? Go to http://teodoro-radha.info/. Enter H356 in the search box to learn more about \"Arthritis: Care Instructions. \"  Current as of: February 24, 2016  Content Version: 11.1  © 4109-2063 [employer]. Care instructions adapted under license by [employer] (which disclaims liability or warranty for this information). If you have questions about a medical condition or this instruction, always ask your healthcare professional. [place] [address] any warranty or liability for your use of this information. Hip Pain: Care Instructions  Your Care Instructions  Hip pain may be caused by many things, including overuse, a fall, or a twisting movement. Another cause of hip pain is arthritis. Your pain may increase when you stand up, walk, or squat. The pain may come and go or may be constant. Home treatment can help relieve hip pain, swelling, and stiffness. If your pain is ongoing, you may need more tests and treatment. Follow-up care is a key part of your treatment and safety. Be sure to make and go to all appointments, and call your doctor if you are having problems. Its also a good idea to know your test results and keep a list of the medicines you take. How can you care for yourself at home? · Take pain medicines exactly as directed. ¨ If the doctor gave you a prescription medicine for pain, take it as prescribed. ¨ If you are not taking a prescription pain medicine, ask your doctor if you can take an over-the-counter medicine. · Rest and protect your hip. Take a break from any activity, including standing or walking, that may cause pain. · Put ice or a cold pack against your hip for 10 to 20 minutes at a time. Try to do this every 1 to 2 hours for the next 3 days (when you are awake) or until the swelling goes down.  Put a thin cloth between the ice and your skin. · Sleep on your healthy side with a pillow between your knees, or sleep on your back with pillows under your knees. · If there is no swelling, you can put moist heat, a heating pad, or a warm cloth on your hip. Do gentle stretching exercises to help keep your hip flexible. · Learn how to prevent falls. Have your vision and hearing checked regularly. Wear slippers or shoes with a nonskid sole. · Stay at a healthy weight. · Wear comfortable shoes. When should you call for help? Call 911 anytime you think you may need emergency care. For example, call if:  · You have sudden chest pain and shortness of breath, or you cough up blood. · You are not able to stand or walk or bear weight. · Your buttocks, legs, or feet feel numb or tingly. · Your leg or foot is cool or pale or changes color. · You have severe pain. Call your doctor now or seek immediate medical care if:  · You have signs of infection, such as:  ¨ Increased pain, swelling, warmth, or redness in the hip area. ¨ Red streaks leading from the hip area. ¨ Pus draining from the hip area. ¨ A fever. · You have signs of a blood clot, such as:  ¨ Pain in your calf, back of the knee, thigh, or groin. ¨ Redness and swelling in your leg or groin. · You are not able to bend, straighten, or move your leg normally. · You have trouble urinating or having bowel movements. Watch closely for changes in your health, and be sure to contact your doctor if:  · You do not get better as expected. Where can you learn more? Go to http://teodoro-radha.info/. Enter E770 in the search box to learn more about \"Hip Pain: Care Instructions. \"  Current as of: May 27, 2016  Content Version: 11.1  © 5324-1935 [employer]. Care instructions adapted under license by [employer] (which disclaims liability or warranty for this information).  If you have questions about a medical condition or this instruction, always ask your healthcare professional. [place] [address] any warranty or liability for your use of this information.

## 2017-03-24 NOTE — ED NOTES
PT A&Ox3, no obvious signs of distress. The provider reviewed discharge instructions with the patient. The patient verbalized understanding. Pt ambulates well out of ED and has ride home with friend.

## 2017-03-24 NOTE — LETTER
[address] 
[facility] EMERGENCY DEPT 
[address] [place] [address] [address] [phone] 
[phone] Work/School Note Date: 3/24/2017 To Whom It May concern: Jacqueline Holguin was seen and treated today in the emergency room by the following provider(s): 
Attending Provider: Angella Henry MD 
Physician Assistant: HENRY Adam. Jacqueline Holguin may return to work on 3/27/17 or sooner, if feeling better. Sincerely, HENRY Adam

## 2017-03-25 NOTE — ED PROVIDER NOTES
HPI Comments: Dilan Mendoza is a 55 y.o. female with significant PMHx of chronic back pain and arthritis, presents ambulatory to the ED with c/o acute onset of left hip pain x this morning. Pt reports she has chronic right hip pain and she has been favoring her left leg. Pt states she has an appointment with Dr. Brown Overton on . Pt states not arthritis medication or pain medication \"works\" for her and she has \"tried them all\". Pt denies injury, blurred vision, dizziness, knee pain, hx of liver disease, hx of thyroid disease, hx of kidney disease, trying OTC pain medication, or currently taking medication for arthritis. PCP: Tera Cross MD  Pain Management: Dr. Dayday Oneal Hx: +tobacco (4 ppd), -EtOH     There are no other complaints, changes or physical findings at this time. Written by JAKOB Sherman, as dictated by aSvannah Pan      The history is provided by the patient. Past Medical History:   Diagnosis Date    Arthritis     Chronic back pain        History reviewed. No pertinent surgical history. Family History:   Problem Relation Age of Onset    Cancer Mother      Breast, bone, & lung Cancer  age 79    Heart Disease Father      Massive Heart attck  age 62       Social History     Social History    Marital status: LEGALLY      Spouse name: N/A    Number of children: N/A    Years of education: N/A     Occupational History    Not on file. Social History Main Topics    Smoking status: Current Every Day Smoker     Packs/day: 4.00     Years: 41.00    Smokeless tobacco: Not on file    Alcohol use No    Drug use: No    Sexual activity: No     Other Topics Concern    Not on file     Social History Narrative         ALLERGIES: Amoxil [amoxicillin] and Tetracycline    Review of Systems   Constitutional: Negative for appetite change, chills and fever. HENT: Negative for congestion. Eyes: Negative for visual disturbance.    Respiratory: Negative for cough, shortness of breath and wheezing. Cardiovascular: Negative for chest pain, palpitations and leg swelling. Gastrointestinal: Negative for abdominal pain. Genitourinary: Negative for dysuria, frequency and urgency. Musculoskeletal: Positive for arthralgias (left hip). Negative for back pain, joint swelling, myalgias and neck stiffness. Skin: Negative for rash. Neurological: Negative for dizziness, syncope, weakness and headaches. Hematological: Negative for adenopathy. Psychiatric/Behavioral: Negative for behavioral problems and dysphoric mood. All other systems reviewed and are negative. Vitals:    03/24/17 1635   BP: (!) 131/93   Pulse: 94   Resp: 14   Temp: 97.9 °F (36.6 °C)   SpO2: 98%   Weight: 77.4 kg (170 lb 10.2 oz)   Height: 5' 10\" (1.778 m)            Physical Exam   Constitutional: She is oriented to person, place, and time. She appears well-developed and well-nourished. No distress. Pt is ambulatory    Eyes: Conjunctivae and EOM are normal. Pupils are equal, round, and reactive to light. Right eye exhibits no discharge. Left eye exhibits no discharge. No scleral icterus. Neck: Normal range of motion. Neck supple. No tracheal deviation present. Cardiovascular: Normal rate, regular rhythm, normal heart sounds and intact distal pulses. Exam reveals no gallop and no friction rub. No murmur heard. Pulmonary/Chest: Effort normal and breath sounds normal. No respiratory distress. She has no wheezes. She has no rales. She exhibits no tenderness. Musculoskeletal: She exhibits no edema. Left hip: She exhibits tenderness. Lumbar back: She exhibits tenderness. No erythema or contusions. Left lumbar musculature tenderness    Lymphadenopathy:     She has no cervical adenopathy. Neurological: She is alert and oriented to person, place, and time. No cranial nerve deficit. Neurovascularly intact distally   Skin: Skin is warm and dry.  No rash noted. No erythema. Psychiatric: She has a normal mood and affect. Her behavior is normal.   Nursing note and vitals reviewed. MDM  Number of Diagnoses or Management Options  Acute pain of left hip:   Arthritis:   Cigarette nicotine dependence without complication:   Elevated blood pressure:   Diagnosis management comments:     DDx: arthritis, strain, sprain, hip pain    Patient Progress  Patient progress: stable    ED Course       Procedures    8212  Discussed the risks of smoking and the benefits of smoking cessation as well as the long term sequelae of smoking with the pt. The patient verbalized their understanding. MEDICATIONS GIVEN:  Medications   oxyCODONE-acetaminophen (PERCOCET) 5-325 mg per tablet 1 Tab (1 Tab Oral Given 3/24/17 1853)   naproxen (NAPROSYN) tablet 500 mg (500 mg Oral Given 3/24/17 1853)       IMPRESSION:  1. Acute pain of left hip    2. Arthritis    3. Elevated blood pressure    4. Cigarette nicotine dependence without complication        PLAN:  1. Discharge Medication List as of 3/24/2017  6:51 PM      START taking these medications    Details   piroxicam (FELDENE) 20 mg capsule Take 1 Cap by mouth daily. , Print, Disp-30 Cap, R-0         CONTINUE these medications which have CHANGED    Details   oxyCODONE-acetaminophen (PERCOCET) 5-325 mg per tablet Take 0.5-1 Tabs by mouth every four (4) hours as needed for Pain.  Max Daily Amount: 6 Tabs., Print, Disp-20 Tab, R-0         STOP taking these medications       diazePAM (VALIUM) 5 mg tablet Comments:   Reason for Stopping:         naproxen (NAPROSYN) 500 mg tablet Comments:   Reason for Stoppin.   Follow-up Information     Follow up With Details Comments [address] Carmella Powell MD   [address] [place], [place] [id number] [address]  [phone]      Amadeo Cisneros MD Schedule an appointment as soon as possible for a visit  [address] [place] [phone]      [facility] EMERGENCY DEPT  If symptoms worsen [address] [place] [address]  [phone]        Return to ED if worse     DISCHARGE NOTE  6:55 PM  The patient has been re-evaluated and is ready for discharge. Reviewed available results with patient. Counseled pt on diagnosis and care plan. Pt has expressed understanding, and all questions have been answered. Pt agrees with plan and agrees to F/U as recommended, or return to the ED if their sxs worsen. Discharge instructions have been provided and explained to the pt, along with reasons to return to the ED. Written by Cristhian Gold, ED Scribe, as dictated by [employer]. This note is prepared by Cristhian Gold, acting as Scribe for [employer]. PA-C Elaine Fleischer : The scribe's documentation has been prepared under my direction and personally reviewed by me in its entirety. I confirm that the note above accurately reflects all work, treatment, procedures, and medical decision making performed by me.    5:41 PM  I was personally available for consultation in the emergency department. I have reviewed the chart and agree with the documentation recorded by the [facility], including the assessment, treatment plan, and disposition.   Kaitlin Agustin MD

## 2017-04-20 ENCOUNTER — OFFICE VISIT (OUTPATIENT)
Dept: BEHAVIORAL/MENTAL HEALTH CLINIC | Age: 57
End: 2017-04-20

## 2017-04-20 VITALS
WEIGHT: 161 LBS | HEIGHT: 70 IN | BODY MASS INDEX: 23.05 KG/M2 | HEART RATE: 118 BPM | SYSTOLIC BLOOD PRESSURE: 127 MMHG | DIASTOLIC BLOOD PRESSURE: 107 MMHG

## 2017-04-20 DIAGNOSIS — F17.200 SMOKER: ICD-10-CM

## 2017-04-20 DIAGNOSIS — F41.9 ANXIETY: Primary | ICD-10-CM

## 2017-04-20 RX ORDER — DIAZEPAM 10 MG/1
10 TABLET ORAL 2 TIMES DAILY
Qty: 60 TAB | Refills: 0 | Status: SHIPPED | OUTPATIENT
Start: 2017-04-20 | End: 2017-05-20

## 2017-04-20 RX ORDER — DIAZEPAM 5 MG/1
2.5 TABLET ORAL 2 TIMES DAILY
COMMUNITY
Start: 2017-04-10 | End: 2017-04-20 | Stop reason: SDUPTHER

## 2017-04-23 NOTE — PROGRESS NOTES
Ambulatory Initial Psychiatric Evaluation     Chief Complaint:   Chief Complaint   Patient presents with    New Patient     Previously seen by Dr. Vanita Moreno        History of Present Illness: Michelle Rogers is a 55 y.o. Single, White  female who presents with symptoms of anxiety and smoking. She was last seen by Dr. Vanita Moreno for 6 years. Pt reports that after multiple trials of psych medications for her anxiety,  had her stabilized on valium 10 mg BID. She is requesting to continue this and is very reluctant to try any other non addictive medicine for long term treatment of anxiety. She smokes 2 ppd and reported that this is a reduction from smoking 4 ppd few years ago. She has never been hospitalized for MH reasons. In the past she has been tried on Xanax, Paxil, Ativan, clonazepam, Wellbutrin, Chantix and Nicotine patch. She was very casual about the severe medical complications of heavy smoking as stated everyone dies of some reasons so what if I get lung cancer !!! 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 She got her MS in  from [facility] last year. Pt was cooperative yet very non chalant abut her mental health and smoking. Denies any psychosis or wilmer. Denies any SI or HI. Andrey any nightmares or flashbacks. Denies any obsession or compulsions. Denies any other illicit drugs or alcohol use.  checked = last 30 day prescription written by Dr. Vanita Moreno on 3/6/17 for 60 tablets. Since then prescription of diazepam written on 3/17/17 for 15 tablets, 3/21/17 for 10 tablets and 4/10 /17 for 15 tablets  . Total = 100 tablets in 35 days . Conclusion over use .      MOCA: 21/29  PHQ 9 = 4     Past Psychiatric History:     Previous psychiatric care: admits  As per HPI    Previous suicide attempts: none    Previous Hospitalizations: admits  denies    Previous psychiatric medications/ECT/TMS: admits  As per HPI          History of rehab, detox, withdrawal: none    Social History:   Social History     Social History    Marital status: LEGALLY      Spouse name: N/A    Number of children: N/A    Years of education: N/A     Social History Main Topics    Smoking status: Current Every Day Smoker     Packs/day: 4.00     Years: 41.00    Smokeless tobacco: None    Alcohol use No    Drug use: No    Sexual activity: No     Other Topics Concern    None     Social History Narrative        Ethnic:   Relationship Status:  x 6-7 years. Has 2 adult children  Living Situation: With adult child  Employment: Mental health worker. MS in   Tobacco/Caffeine/Alchol use: tobacco use: smoked 2 packs per day(s) for many years  Illicit Drug Social History:  no history of illicit drug use  Hobbies:  reading  Sexual:  heterosexual    Family History:   Family History   Problem Relation Age of Onset    Cancer Mother      Breast, bone, & lung Cancer  age 79    Heart Disease Father      Massive Heart attck  age 62        Past Medical History:   Past Medical History:   Diagnosis Date    Arthritis     Chronic back pain          Allergies: Allergies   Allergen Reactions    Amoxil [Amoxicillin] Hives    Tetracycline Unknown (comments)        Medication List:   Current Outpatient Prescriptions   Medication Sig Dispense Refill    diazePAM (VALIUM) 10 mg tablet Take 1 Tab by mouth two (2) times a day for 30 days. Max Daily Amount: 20 mg.  Indications: anxiety 60 Tab 0      ROS:   Constitutional: positive for fatigue  Respiratory: negative for cough or wheezing  Cardiovascular: negative for chest pain, palpitations  Gastrointestinal: negative for reflux symptoms and constipation  Genitourinary:negative for dysuria and urinary incontinence  Neurological: negative for headaches, seizures and memory problems  Behavioral/Psych: positive for anxiety, negative for SI, depression and mood swings    Psychiatric/Mental Status Examination: MENTAL STATUS EXAM:  Sensorium  oriented to time, place and person   Orientation person, place, time/date, situation, day of week, month of year and year   Relations cooperative   Eye Contact appropriate   Appearance:  age appropriate and casually dressed   Motor Behavior:  within normal limits   Speech:  normal pitch and normal volume   Vocabulary average   Thought Process: goal directed and logical   Thought Content free of delusions and free of hallucinations   Suicidal ideations none   Homicidal ideations none   Mood:  euthymic   Affect:  full range   Memory recent  adequate   Memory remote:  adequate   Concentration:  adequate   Abstraction:  concrete   Insight:  fair   Reliability fair   Judgment:  fair     Assessment & Diagnoses: 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 She is dependent on nicotine and diazepam and not ready to quit any one of these addiction. She has over used diazepam by going to different providers sine her psychiatrist  retired. I agreed to RF her prescription for one time only for her to come back only if she is ready to work on diazepam dependence and nicotine dependence. Axis I: Anxiety Disorder NOS, Nicotine dependence  Axis II: R/O Personality Disorder NOS  Axis III: chronic hip pain  Axis IV: Other psychosocial or environmental problems  Axis V:61-70 mild symptoms    Treatment Plan:   1. Medication: continue diazepam 10 mg, 1 PO BID x 60, NRF. 2. Discussed: the potential medication side effects  dry mouth, GI disturbance, somnolence, tremor  patient given opportunity to ask questions  3. Psychotherapy: none recommended at this time  4. Medical: right hip pain  5. Return to Clinic: Follow-up Disposition:  Return in about 4 weeks (around 5/18/2017). 6. Educated on nicotine and benzo dependence. Strongly encouraged smoking cessation.        The risk versus benefits of treatment were discussed and side effects explained. Patient agreed with plan. Patient instructed to call with any side effects.      Time spent with Patient:  30 to 74 minutes    Lori Flowers MD  4/23/2017

## 2020-07-28 ENCOUNTER — APPOINTMENT (OUTPATIENT)
Dept: GENERAL RADIOLOGY | Age: 60
End: 2020-07-28
Attending: EMERGENCY MEDICINE
Payer: COMMERCIAL

## 2020-07-28 ENCOUNTER — HOSPITAL ENCOUNTER (EMERGENCY)
Age: 60
Discharge: HOME | End: 2020-07-28
Attending: EMERGENCY MEDICINE
Payer: COMMERCIAL

## 2020-07-28 VITALS
SYSTOLIC BLOOD PRESSURE: 129 MMHG | WEIGHT: 201.5 LBS | RESPIRATION RATE: 18 BRPM | HEIGHT: 71 IN | BODY MASS INDEX: 28.21 KG/M2 | HEART RATE: 89 BPM | TEMPERATURE: 97.8 F | OXYGEN SATURATION: 97 % | DIASTOLIC BLOOD PRESSURE: 84 MMHG

## 2020-07-28 DIAGNOSIS — L30.8 OTHER ECZEMA: ICD-10-CM

## 2020-07-28 DIAGNOSIS — R06.02 SOB (SHORTNESS OF BREATH): Primary | ICD-10-CM

## 2020-07-28 DIAGNOSIS — F17.200 SMOKING ADDICTION: ICD-10-CM

## 2020-07-28 LAB
ALBUMIN SERPL-MCNC: 3.7 G/DL (ref 3.5–5)
ALBUMIN/GLOB SERPL: 0.9 {RATIO} (ref 1.1–2.2)
ALP SERPL-CCNC: 95 U/L (ref 45–117)
ALT SERPL-CCNC: 43 U/L (ref 12–78)
ANION GAP SERPL CALC-SCNC: 8 MMOL/L (ref 5–15)
AST SERPL-CCNC: 20 U/L (ref 15–37)
BASOPHILS # BLD: 0.1 K/UL (ref 0–0.1)
BASOPHILS NFR BLD: 1 % (ref 0–1)
BILIRUB SERPL-MCNC: 0.4 MG/DL (ref 0.2–1)
BUN SERPL-MCNC: 11 MG/DL (ref 6–20)
BUN/CREAT SERPL: 10 (ref 12–20)
CALCIUM SERPL-MCNC: 9.1 MG/DL (ref 8.5–10.1)
CHLORIDE SERPL-SCNC: 106 MMOL/L (ref 97–108)
CK SERPL-CCNC: 59 U/L (ref 26–192)
CO2 SERPL-SCNC: 26 MMOL/L (ref 21–32)
CREAT SERPL-MCNC: 1.13 MG/DL (ref 0.55–1.02)
DIFFERENTIAL METHOD BLD: ABNORMAL
EOSINOPHIL # BLD: 0.2 K/UL (ref 0–0.4)
EOSINOPHIL NFR BLD: 3 % (ref 0–7)
ERYTHROCYTE [DISTWIDTH] IN BLOOD BY AUTOMATED COUNT: 13.3 % (ref 11.5–14.5)
GLOBULIN SER CALC-MCNC: 4.1 G/DL (ref 2–4)
GLUCOSE SERPL-MCNC: 169 MG/DL (ref 65–100)
HCT VFR BLD AUTO: 48.3 % (ref 35–47)
HGB BLD-MCNC: 16 G/DL (ref 11.5–16)
IMM GRANULOCYTES # BLD AUTO: 0 K/UL (ref 0–0.04)
IMM GRANULOCYTES NFR BLD AUTO: 0 % (ref 0–0.5)
LYMPHOCYTES # BLD: 2.3 K/UL (ref 0.8–3.5)
LYMPHOCYTES NFR BLD: 30 % (ref 12–49)
MCH RBC QN AUTO: 29.6 PG (ref 26–34)
MCHC RBC AUTO-ENTMCNC: 33.1 G/DL (ref 30–36.5)
MCV RBC AUTO: 89.4 FL (ref 80–99)
MONOCYTES # BLD: 0.3 K/UL (ref 0–1)
MONOCYTES NFR BLD: 4 % (ref 5–13)
NEUTS SEG # BLD: 4.7 K/UL (ref 1.8–8)
NEUTS SEG NFR BLD: 62 % (ref 32–75)
NRBC # BLD: 0 K/UL (ref 0–0.01)
NRBC BLD-RTO: 0 PER 100 WBC
PLATELET # BLD AUTO: 269 K/UL (ref 150–400)
PMV BLD AUTO: 10.5 FL (ref 8.9–12.9)
POTASSIUM SERPL-SCNC: 3.6 MMOL/L (ref 3.5–5.1)
PROT SERPL-MCNC: 7.8 G/DL (ref 6.4–8.2)
RBC # BLD AUTO: 5.4 M/UL (ref 3.8–5.2)
SODIUM SERPL-SCNC: 140 MMOL/L (ref 136–145)
TROPONIN I SERPL-MCNC: <0.05 NG/ML
WBC # BLD AUTO: 7.7 K/UL (ref 3.6–11)

## 2020-07-28 PROCEDURE — 71046 X-RAY EXAM CHEST 2 VIEWS: CPT

## 2020-07-28 PROCEDURE — 85025 COMPLETE CBC W/AUTO DIFF WBC: CPT

## 2020-07-28 PROCEDURE — 82550 ASSAY OF CK (CPK): CPT

## 2020-07-28 PROCEDURE — 80053 COMPREHEN METABOLIC PANEL: CPT

## 2020-07-28 PROCEDURE — 84484 ASSAY OF TROPONIN QUANT: CPT

## 2020-07-28 PROCEDURE — 93005 ELECTROCARDIOGRAM TRACING: CPT

## 2020-07-28 PROCEDURE — 99283 EMERGENCY DEPT VISIT LOW MDM: CPT

## 2020-07-28 PROCEDURE — 36415 COLL VENOUS BLD VENIPUNCTURE: CPT

## 2020-07-28 RX ORDER — ALBUTEROL SULFATE 90 UG/1
2 AEROSOL, METERED RESPIRATORY (INHALATION)
Qty: 1 INHALER | Refills: 1 | Status: SHIPPED | OUTPATIENT
Start: 2020-07-28

## 2020-07-28 RX ORDER — PREDNISONE 10 MG/1
TABLET ORAL
Qty: 21 TAB | Refills: 0 | Status: SHIPPED | OUTPATIENT
Start: 2020-07-28

## 2020-07-28 RX ORDER — BUTORPHANOL TARTRATE 10 MG/ML
1 SPRAY NASAL
COMMUNITY

## 2020-07-28 RX ORDER — TRIAMCINOLONE ACETONIDE 1 MG/G
CREAM TOPICAL 2 TIMES DAILY
Qty: 15 G | Refills: 0 | Status: SHIPPED | OUTPATIENT
Start: 2020-07-28

## 2020-07-28 RX ORDER — CETIRIZINE HCL 10 MG
10 TABLET ORAL DAILY
Qty: 20 TAB | Refills: 0 | Status: SHIPPED | OUTPATIENT
Start: 2020-07-28

## 2020-07-28 NOTE — ED NOTES
Discharge summary and prescriptions reviewed with patient by provider. Ambulatory off unit with steady gait.

## 2020-07-28 NOTE — ED PROVIDER NOTES
EMERGENCY DEPARTMENT HISTORY AND PHYSICAL EXAM      Date: 7/28/2020  Patient Name: Melissa Collins    History of Presenting Illness     Chief Complaint   Patient presents with    Shortness of Breath     x 1 month, reports that she was dx'd wtih COPD and asthma 25 years ago, but does not see a PCP; reports that she is a 3 ppd smoker x 48 years       History Provided By: Patient    HPI: Melissa Collins, 62 y.o. female with a stated history of COPD and asthma caused by cigarette smoking presents ambulatory with her adult son to the ED with cc of a month or so of moderately severe and intermittent shortness of breath that is not associated with fever or chest pain. In spite of her symptoms she continues to smoke cigarettes. Separately, she complains of this itchy rash to the back of her left heel for which she seen limited improvement with coconut oil moisturizer and for which there has been no injury. She tells me she noticed this rash after wearing some shoes and it just has not gone away. There are no other complaints, changes, or physical findings at this time. PCP: None    Current Outpatient Medications   Medication Sig Dispense Refill    butorphanol (STADOL) 10 mg/mL nasal spray 1 Spray every four (4) hours as needed for Pain.  albuterol (PROVENTIL HFA, VENTOLIN HFA, PROAIR HFA) 90 mcg/actuation inhaler Take 2 Puffs by inhalation every four (4) hours as needed for Wheezing. 1 Inhaler 1    predniSONE (STERAPRED DS) 10 mg dose pack Per Dose Pack instructions 21 Tab 0    cetirizine (ZyrTEC) 10 mg tablet Take 1 Tab by mouth daily. 20 Tab 0    triamcinolone acetonide (KENALOG) 0.1 % topical cream Apply  to affected area two (2) times a day. use thin layer 15 g 0     Past History     Past Medical History:  Past Medical History:   Diagnosis Date    Arthritis     Chronic back pain        Past Surgical History:  History reviewed. No pertinent surgical history.     Family History:  Family History Problem Relation Age of Onset    Cancer Mother         Breast, bone, & lung Cancer  age 79    Heart Disease Father         Massive Heart attck  age 62       Social History:  Social History     Tobacco Use    Smoking status: Current Every Day Smoker     Packs/day: 3.00     Years: 41.00     Pack years: 123.00    Smokeless tobacco: Never Used   Substance Use Topics    Alcohol use: No    Drug use: No       Allergies: Allergies   Allergen Reactions    Amoxil [Amoxicillin] Hives    Tetracycline Unknown (comments)     Review of Systems   Review of Systems   Constitutional: Negative for fatigue and fever. HENT: Negative for ear pain and sore throat. Eyes: Negative for pain, redness and visual disturbance. Respiratory: Positive for shortness of breath. Negative for cough. Cardiovascular: Negative for chest pain and palpitations. Gastrointestinal: Negative for abdominal pain, nausea and vomiting. Genitourinary: Negative for dysuria, frequency and urgency. Musculoskeletal: Negative for back pain, gait problem, neck pain and neck stiffness. Skin: Positive for rash. Negative for wound. Neurological: Negative for dizziness, weakness, light-headedness, numbness and headaches. Physical Exam   Physical Exam  Vitals signs and nursing note reviewed. Constitutional:       General: She is not in acute distress. Appearance: She is well-developed. She is not toxic-appearing. HENT:      Head: Normocephalic and atraumatic. Jaw: No trismus. Right Ear: External ear normal.      Left Ear: External ear normal.      Nose: Nose normal.      Mouth/Throat:      Pharynx: Uvula midline. Eyes:      General: No scleral icterus. Conjunctiva/sclera: Conjunctivae normal.      Pupils: Pupils are equal, round, and reactive to light. Neck:      Musculoskeletal: Full passive range of motion without pain and normal range of motion.    Cardiovascular:      Rate and Rhythm: Normal rate and regular rhythm. Pulmonary:      Effort: Pulmonary effort is normal. No tachypnea, accessory muscle usage or respiratory distress. Breath sounds: No decreased breath sounds or wheezing. Abdominal:      Palpations: Abdomen is soft. Tenderness: There is no abdominal tenderness. Musculoskeletal: Normal range of motion. Skin:     Findings: Rash present. Comments: There is a small hyperpigmented, lichenified patch of skin of the posterior heel without fluctuance or tenderness. Neurological:      Mental Status: She is alert and oriented to person, place, and time. She is not disoriented. GCS: GCS eye subscore is 4. GCS verbal subscore is 5. GCS motor subscore is 6. Cranial Nerves: No cranial nerve deficit. Psychiatric:         Speech: Speech normal.       Diagnostic Study Results     Labs -     Recent Results (from the past 12 hour(s))   EKG, 12 LEAD, INITIAL    Collection Time: 07/28/20  4:02 PM   Result Value Ref Range    Ventricular Rate 95 BPM    Atrial Rate 95 BPM    P-R Interval 182 ms    QRS Duration 88 ms    Q-T Interval 364 ms    QTC Calculation (Bezet) 457 ms    Calculated P Axis 51 degrees    Calculated R Axis 13 degrees    Calculated T Axis 34 degrees    Diagnosis       Normal sinus rhythm  Possible Left atrial enlargement  No previous ECGs available     CBC WITH AUTOMATED DIFF    Collection Time: 07/28/20  4:34 PM   Result Value Ref Range    WBC 7.7 3.6 - 11.0 K/uL    RBC 5.40 (H) 3.80 - 5.20 M/uL    HGB 16.0 11.5 - 16.0 g/dL    HCT 48.3 (H) 35.0 - 47.0 %    MCV 89.4 80.0 - 99.0 FL    MCH 29.6 26.0 - 34.0 PG    MCHC 33.1 30.0 - 36.5 g/dL    RDW 13.3 11.5 - 14.5 %    PLATELET 977 163 - 357 K/uL    MPV 10.5 8.9 - 12.9 FL    NRBC 0.0 0  WBC    ABSOLUTE NRBC 0.00 0.00 - 0.01 K/uL    NEUTROPHILS 62 32 - 75 %    LYMPHOCYTES 30 12 - 49 %    MONOCYTES 4 (L) 5 - 13 %    EOSINOPHILS 3 0 - 7 %    BASOPHILS 1 0 - 1 %    IMMATURE GRANULOCYTES 0 0.0 - 0.5 %    ABS. NEUTROPHILS 4.7 1.8 - 8.0 K/UL    ABS. LYMPHOCYTES 2.3 0.8 - 3.5 K/UL    ABS. MONOCYTES 0.3 0.0 - 1.0 K/UL    ABS. EOSINOPHILS 0.2 0.0 - 0.4 K/UL    ABS. BASOPHILS 0.1 0.0 - 0.1 K/UL    ABS. IMM. GRANS. 0.0 0.00 - 0.04 K/UL    DF AUTOMATED     METABOLIC PANEL, COMPREHENSIVE    Collection Time: 07/28/20  4:34 PM   Result Value Ref Range    Sodium 140 136 - 145 mmol/L    Potassium 3.6 3.5 - 5.1 mmol/L    Chloride 106 97 - 108 mmol/L    CO2 26 21 - 32 mmol/L    Anion gap 8 5 - 15 mmol/L    Glucose 169 (H) 65 - 100 mg/dL    BUN 11 6 - 20 MG/DL    Creatinine 1.13 (H) 0.55 - 1.02 MG/DL    BUN/Creatinine ratio 10 (L) 12 - 20      GFR est AA 60 (L) >60 ml/min/1.73m2    GFR est non-AA 49 (L) >60 ml/min/1.73m2    Calcium 9.1 8.5 - 10.1 MG/DL    Bilirubin, total 0.4 0.2 - 1.0 MG/DL    ALT (SGPT) 43 12 - 78 U/L    AST (SGOT) 20 15 - 37 U/L    Alk. phosphatase 95 45 - 117 U/L    Protein, total 7.8 6.4 - 8.2 g/dL    Albumin 3.7 3.5 - 5.0 g/dL    Globulin 4.1 (H) 2.0 - 4.0 g/dL    A-G Ratio 0.9 (L) 1.1 - 2.2     CK W/ REFLX CKMB    Collection Time: 07/28/20  4:34 PM   Result Value Ref Range    CK 59 26 - 192 U/L   TROPONIN I    Collection Time: 07/28/20  4:34 PM   Result Value Ref Range    Troponin-I, Qt. <0.05 <0.05 ng/mL       Radiologic Studies -   XR CHEST PA LAT   Final Result   IMPRESSION: Normal two view chest x-ray. CT Results  (Last 48 hours)    None        CXR Results  (Last 48 hours)               07/28/20 1639  XR CHEST PA LAT Final result    Impression:  IMPRESSION: Normal two view chest x-ray. Narrative:  INDICATION: SOB       EXAM: CXR 2 View       FINDINGS: Frontal and lateral views of the chest show clear lungs. Heart size is   normal. There is no pulmonary edema. There is no evident pneumothorax,   adenopathy or effusion. Medical Decision Making   I am the first provider for this patient.     I reviewed the vital signs, available nursing notes, past medical history, past surgical history, family history and social history. Vital Signs-Reviewed the patient's vital signs. Patient Vitals for the past 12 hrs:   Temp Pulse Resp BP SpO2   07/28/20 1730  89 18 129/84 97 %   07/28/20 1557 97.8 °F (36.6 °C) (!) 107 18 (!) 150/104 97 %       Pulse Oximetry Analysis - 97% on RA    Records Reviewed: Nursing Notes, Old Medical Records, Previous Radiology Studies and Previous Laboratory Studies    Provider Notes (Medical Decision Making):   DDx: Smoking addiction, COPD, acute bronchospasm, lung cancer, eczema    TOBACCO COUNSELING:  Spent 1-5 minutes discussing the risks of smoking and the benefits of smoking cessation as well as the long term sequelae of smoking with the pt who verbalized his understanding. Reviewed strategies for success, including gradually decreasing the number of cigarettes smoked a day. ED Course:   Initial assessment performed. The patients presenting problems have been discussed, and they are in agreement with the care plan formulated and outlined with them. I have encouraged them to ask questions as they arise throughout their visit. Disposition:  Discharge    PLAN:  1. Discharge Medication List as of 7/28/2020  5:50 PM      START taking these medications    Details   albuterol (PROVENTIL HFA, VENTOLIN HFA, PROAIR HFA) 90 mcg/actuation inhaler Take 2 Puffs by inhalation every four (4) hours as needed for Wheezing., Normal, Disp-1 Inhaler,R-1      predniSONE (STERAPRED DS) 10 mg dose pack Per Dose Pack instructions, Normal, Disp-21 Tab,R-0      cetirizine (ZyrTEC) 10 mg tablet Take 1 Tab by mouth daily. , Normal, Disp-20 Tab,R-0      triamcinolone acetonide (KENALOG) 0.1 % topical cream Apply  to affected area two (2) times a day. use thin layer, Normal, Disp-15 g,R-0         CONTINUE these medications which have NOT CHANGED    Details   butorphanol (STADOL) 10 mg/mL nasal spray 1 Spray every four (4) hours as needed for Pain., Historical Med           2.    Follow-up Information     Follow up With Specialties Details Why [id number] [facility] Internal Medicine Schedule an appointment as soon as possible for a visit PRIMARY CARE: call to schedule follow up [address] [place] [address]  [address] [facility]  [phone]        Return to ED if worse     Diagnosis     Clinical Impression:   1. SOB (shortness of breath)    2. Smoking addiction    3.  Other eczema

## 2020-07-29 LAB
ATRIAL RATE: 95 BPM
CALCULATED P AXIS, ECG09: 51 DEGREES
CALCULATED R AXIS, ECG10: 13 DEGREES
CALCULATED T AXIS, ECG11: 34 DEGREES
DIAGNOSIS, 93000: NORMAL
P-R INTERVAL, ECG05: 182 MS
Q-T INTERVAL, ECG07: 364 MS
QRS DURATION, ECG06: 88 MS
QTC CALCULATION (BEZET), ECG08: 457 MS
VENTRICULAR RATE, ECG03: 95 BPM

## 2023-05-11 RX ORDER — CETIRIZINE HYDROCHLORIDE 10 MG/1
TABLET ORAL DAILY
COMMUNITY
Start: 2020-07-28

## 2023-05-11 RX ORDER — ALBUTEROL SULFATE 90 UG/1
2 AEROSOL, METERED RESPIRATORY (INHALATION) EVERY 4 HOURS PRN
COMMUNITY
Start: 2020-07-28

## 2023-05-11 RX ORDER — PREDNISONE 10 MG/1
TABLET ORAL
COMMUNITY
Start: 2020-07-28

## 2023-05-11 RX ORDER — BUTORPHANOL TARTRATE 10 MG/ML
1 SPRAY NASAL EVERY 4 HOURS PRN
COMMUNITY

## 2023-05-11 RX ORDER — TRIAMCINOLONE ACETONIDE 1 MG/G
CREAM TOPICAL 2 TIMES DAILY
COMMUNITY
Start: 2020-07-28